# Patient Record
(demographics unavailable — no encounter records)

---

## 2017-08-14 RX ORDER — AZITHROMYCIN 250 MG/1
TABLET, FILM COATED ORAL
Qty: 6 TABLET | Refills: 0 | Status: SHIPPED | OUTPATIENT
Start: 2017-08-14 | End: 2017-08-19

## 2017-08-14 NOTE — TELEPHONE ENCOUNTER
Spoke with patient, states tried to come in but with work it is hard therefore would like to know if a zpack can be called in until she can come in for an appointment. She would really appreciate it.

## 2017-08-14 NOTE — TELEPHONE ENCOUNTER
----- Message from Katt Valero sent at 8/14/2017  4:07 PM CDT -----  Contact: Patient  Rosario, patient [phone], Called for a same day appointment this morning no on returned her call. Can an antibotic be called in to [facility] for sinus pressure.  Please advise. Thanks.    Pharmacy  [facility]  [place], [place]  Phone [phone]